# Patient Record
Sex: MALE | Race: WHITE | NOT HISPANIC OR LATINO | Employment: FULL TIME | URBAN - METROPOLITAN AREA
[De-identification: names, ages, dates, MRNs, and addresses within clinical notes are randomized per-mention and may not be internally consistent; named-entity substitution may affect disease eponyms.]

---

## 2019-08-29 ENCOUNTER — TELEPHONE (OUTPATIENT)
Dept: INTERVENTIONAL RADIOLOGY/VASCULAR | Facility: CLINIC | Age: 59
End: 2019-08-29

## 2020-07-15 ENCOUNTER — HOSPITAL ENCOUNTER (INPATIENT)
Facility: HOSPITAL | Age: 60
LOS: 2 days | Discharge: HOME | DRG: 177 | End: 2020-07-17
Attending: EMERGENCY MEDICINE | Admitting: HOSPITALIST
Payer: COMMERCIAL

## 2020-07-15 DIAGNOSIS — R51.9 ACUTE NONINTRACTABLE HEADACHE, UNSPECIFIED HEADACHE TYPE: ICD-10-CM

## 2020-07-15 DIAGNOSIS — R05.9 COUGH: ICD-10-CM

## 2020-07-15 DIAGNOSIS — R06.02 SHORTNESS OF BREATH: ICD-10-CM

## 2020-07-15 DIAGNOSIS — R11.0 NAUSEA: ICD-10-CM

## 2020-07-15 DIAGNOSIS — U07.1 COVID-19: Primary | ICD-10-CM

## 2020-07-15 DIAGNOSIS — R09.02 HYPOXIA: ICD-10-CM

## 2020-07-15 PROBLEM — J96.01 ACUTE RESPIRATORY FAILURE WITH HYPOXIA: Status: ACTIVE | Noted: 2020-07-15

## 2020-07-15 PROBLEM — J12.82 PNEUMONIA DUE TO COVID-19 VIRUS: Status: ACTIVE | Noted: 2020-07-15

## 2020-07-15 LAB
ALBUMIN SERPL BCP-MCNC: 2.3 G/DL (ref 3.5–5.2)
ALP SERPL-CCNC: 153 U/L (ref 55–135)
ALT SERPL W/O P-5'-P-CCNC: 91 U/L (ref 10–44)
ANION GAP SERPL CALC-SCNC: 11 MMOL/L (ref 8–16)
AST SERPL-CCNC: 80 U/L (ref 10–40)
BASOPHILS # BLD AUTO: 0.02 K/UL (ref 0–0.2)
BASOPHILS NFR BLD: 0.2 % (ref 0–1.9)
BILIRUB SERPL-MCNC: 0.6 MG/DL (ref 0.1–1)
BNP SERPL-MCNC: 25 PG/ML (ref 0–99)
BUN SERPL-MCNC: 7 MG/DL (ref 8–23)
CALCIUM SERPL-MCNC: 8.7 MG/DL (ref 8.7–10.5)
CHLORIDE SERPL-SCNC: 94 MMOL/L (ref 95–110)
CK SERPL-CCNC: 33 U/L (ref 20–200)
CO2 SERPL-SCNC: 27 MMOL/L (ref 23–29)
CREAT SERPL-MCNC: 0.8 MG/DL (ref 0.5–1.4)
CRP SERPL-MCNC: 311.9 MG/L (ref 0–8.2)
DIFFERENTIAL METHOD: ABNORMAL
EOSINOPHIL # BLD AUTO: 0.1 K/UL (ref 0–0.5)
EOSINOPHIL NFR BLD: 1.1 % (ref 0–8)
ERYTHROCYTE [DISTWIDTH] IN BLOOD BY AUTOMATED COUNT: 12.7 % (ref 11.5–14.5)
EST. GFR  (AFRICAN AMERICAN): >60 ML/MIN/1.73 M^2
EST. GFR  (NON AFRICAN AMERICAN): >60 ML/MIN/1.73 M^2
FERRITIN SERPL-MCNC: 2332 NG/ML (ref 20–300)
GLUCOSE SERPL-MCNC: 105 MG/DL (ref 70–110)
HCT VFR BLD AUTO: 39.4 % (ref 40–54)
HGB BLD-MCNC: 13.1 G/DL (ref 14–18)
IMM GRANULOCYTES # BLD AUTO: 0.12 K/UL (ref 0–0.04)
IMM GRANULOCYTES NFR BLD AUTO: 1.5 % (ref 0–0.5)
LACTATE SERPL-SCNC: 1.4 MMOL/L (ref 0.5–2.2)
LDH SERPL L TO P-CCNC: 461 U/L (ref 110–260)
LYMPHOCYTES # BLD AUTO: 0.8 K/UL (ref 1–4.8)
LYMPHOCYTES NFR BLD: 9.5 % (ref 18–48)
MCH RBC QN AUTO: 30.5 PG (ref 27–31)
MCHC RBC AUTO-ENTMCNC: 33.2 G/DL (ref 32–36)
MCV RBC AUTO: 92 FL (ref 82–98)
MONOCYTES # BLD AUTO: 0.7 K/UL (ref 0.3–1)
MONOCYTES NFR BLD: 8.5 % (ref 4–15)
NEUTROPHILS # BLD AUTO: 6.5 K/UL (ref 1.8–7.7)
NEUTROPHILS NFR BLD: 79.2 % (ref 38–73)
NRBC BLD-RTO: 0 /100 WBC
PLATELET # BLD AUTO: 576 K/UL (ref 150–350)
PMV BLD AUTO: 9.6 FL (ref 9.2–12.9)
POTASSIUM SERPL-SCNC: 4.1 MMOL/L (ref 3.5–5.1)
PROCALCITONIN SERPL IA-MCNC: 0.09 NG/ML
PROT SERPL-MCNC: 7.6 G/DL (ref 6–8.4)
RBC # BLD AUTO: 4.3 M/UL (ref 4.6–6.2)
SARS-COV-2 RDRP RESP QL NAA+PROBE: POSITIVE
SODIUM SERPL-SCNC: 132 MMOL/L (ref 136–145)
TROPONIN I SERPL DL<=0.01 NG/ML-MCNC: 0.02 NG/ML (ref 0–0.03)
WBC # BLD AUTO: 8.24 K/UL (ref 3.9–12.7)

## 2020-07-15 PROCEDURE — 87040 BLOOD CULTURE FOR BACTERIA: CPT | Mod: 59

## 2020-07-15 PROCEDURE — 96375 TX/PRO/DX INJ NEW DRUG ADDON: CPT

## 2020-07-15 PROCEDURE — 25000003 PHARM REV CODE 250: Performed by: HOSPITALIST

## 2020-07-15 PROCEDURE — 83605 ASSAY OF LACTIC ACID: CPT

## 2020-07-15 PROCEDURE — 25000003 PHARM REV CODE 250: Performed by: EMERGENCY MEDICINE

## 2020-07-15 PROCEDURE — 25000242 PHARM REV CODE 250 ALT 637 W/ HCPCS: Performed by: EMERGENCY MEDICINE

## 2020-07-15 PROCEDURE — 80053 COMPREHEN METABOLIC PANEL: CPT

## 2020-07-15 PROCEDURE — 63600175 PHARM REV CODE 636 W HCPCS: Performed by: EMERGENCY MEDICINE

## 2020-07-15 PROCEDURE — 93005 ELECTROCARDIOGRAM TRACING: CPT

## 2020-07-15 PROCEDURE — 85025 COMPLETE CBC W/AUTO DIFF WBC: CPT

## 2020-07-15 PROCEDURE — 99285 EMERGENCY DEPT VISIT HI MDM: CPT | Mod: 25

## 2020-07-15 PROCEDURE — 84484 ASSAY OF TROPONIN QUANT: CPT

## 2020-07-15 PROCEDURE — 96374 THER/PROPH/DIAG INJ IV PUSH: CPT

## 2020-07-15 PROCEDURE — 84145 PROCALCITONIN (PCT): CPT

## 2020-07-15 PROCEDURE — 82550 ASSAY OF CK (CPK): CPT

## 2020-07-15 PROCEDURE — 93010 EKG 12-LEAD: ICD-10-PCS | Mod: ,,, | Performed by: INTERNAL MEDICINE

## 2020-07-15 PROCEDURE — 83880 ASSAY OF NATRIURETIC PEPTIDE: CPT

## 2020-07-15 PROCEDURE — 86140 C-REACTIVE PROTEIN: CPT

## 2020-07-15 PROCEDURE — 83615 LACTATE (LD) (LDH) ENZYME: CPT

## 2020-07-15 PROCEDURE — U0002 COVID-19 LAB TEST NON-CDC: HCPCS

## 2020-07-15 PROCEDURE — 11000001 HC ACUTE MED/SURG PRIVATE ROOM

## 2020-07-15 PROCEDURE — 63600175 PHARM REV CODE 636 W HCPCS: Performed by: HOSPITALIST

## 2020-07-15 PROCEDURE — 82728 ASSAY OF FERRITIN: CPT

## 2020-07-15 PROCEDURE — 93010 ELECTROCARDIOGRAM REPORT: CPT | Mod: ,,, | Performed by: INTERNAL MEDICINE

## 2020-07-15 RX ORDER — AZITHROMYCIN 250 MG/1
250 TABLET, FILM COATED ORAL DAILY
Status: DISCONTINUED | OUTPATIENT
Start: 2020-07-16 | End: 2020-07-17

## 2020-07-15 RX ORDER — ACETAMINOPHEN 325 MG/1
650 TABLET ORAL EVERY 6 HOURS PRN
Status: DISCONTINUED | OUTPATIENT
Start: 2020-07-15 | End: 2020-07-17 | Stop reason: HOSPADM

## 2020-07-15 RX ORDER — PROMETHAZINE HYDROCHLORIDE AND CODEINE PHOSPHATE 6.25; 1 MG/5ML; MG/5ML
10 SOLUTION ORAL EVERY 4 HOURS PRN
Status: DISCONTINUED | OUTPATIENT
Start: 2020-07-15 | End: 2020-07-17 | Stop reason: HOSPADM

## 2020-07-15 RX ORDER — ONDANSETRON 2 MG/ML
4 INJECTION INTRAMUSCULAR; INTRAVENOUS EVERY 4 HOURS PRN
Status: DISCONTINUED | OUTPATIENT
Start: 2020-07-15 | End: 2020-07-17 | Stop reason: HOSPADM

## 2020-07-15 RX ORDER — ALBUTEROL SULFATE 90 UG/1
6 AEROSOL, METERED RESPIRATORY (INHALATION)
Status: COMPLETED | OUTPATIENT
Start: 2020-07-15 | End: 2020-07-15

## 2020-07-15 RX ORDER — MORPHINE SULFATE 10 MG/ML
4 INJECTION INTRAMUSCULAR; INTRAVENOUS; SUBCUTANEOUS EVERY 4 HOURS PRN
Status: DISCONTINUED | OUTPATIENT
Start: 2020-07-15 | End: 2020-07-17 | Stop reason: HOSPADM

## 2020-07-15 RX ORDER — ALBUTEROL SULFATE 90 UG/1
6 AEROSOL, METERED RESPIRATORY (INHALATION) EVERY 4 HOURS PRN
Status: DISCONTINUED | OUTPATIENT
Start: 2020-07-15 | End: 2020-07-17 | Stop reason: HOSPADM

## 2020-07-15 RX ORDER — ENOXAPARIN SODIUM 100 MG/ML
40 INJECTION SUBCUTANEOUS EVERY 24 HOURS
Status: DISCONTINUED | OUTPATIENT
Start: 2020-07-16 | End: 2020-07-17 | Stop reason: HOSPADM

## 2020-07-15 RX ORDER — PROMETHAZINE HYDROCHLORIDE AND CODEINE PHOSPHATE 6.25; 1 MG/5ML; MG/5ML
10 SOLUTION ORAL
Status: COMPLETED | OUTPATIENT
Start: 2020-07-15 | End: 2020-07-15

## 2020-07-15 RX ORDER — ONDANSETRON 2 MG/ML
4 INJECTION INTRAMUSCULAR; INTRAVENOUS
Status: COMPLETED | OUTPATIENT
Start: 2020-07-15 | End: 2020-07-15

## 2020-07-15 RX ORDER — ENOXAPARIN SODIUM 100 MG/ML
1 INJECTION SUBCUTANEOUS EVERY 12 HOURS
Status: DISCONTINUED | OUTPATIENT
Start: 2020-07-15 | End: 2020-07-15

## 2020-07-15 RX ORDER — POLYETHYLENE GLYCOL 3350 17 G/17G
17 POWDER, FOR SOLUTION ORAL DAILY
Status: DISCONTINUED | OUTPATIENT
Start: 2020-07-15 | End: 2020-07-17 | Stop reason: HOSPADM

## 2020-07-15 RX ORDER — AMOXICILLIN 250 MG
1 CAPSULE ORAL 2 TIMES DAILY
Status: DISCONTINUED | OUTPATIENT
Start: 2020-07-15 | End: 2020-07-17 | Stop reason: HOSPADM

## 2020-07-15 RX ORDER — SODIUM CHLORIDE 0.9 % (FLUSH) 0.9 %
10 SYRINGE (ML) INJECTION
Status: DISCONTINUED | OUTPATIENT
Start: 2020-07-15 | End: 2020-07-17 | Stop reason: HOSPADM

## 2020-07-15 RX ORDER — KETOROLAC TROMETHAMINE 30 MG/ML
30 INJECTION, SOLUTION INTRAMUSCULAR; INTRAVENOUS EVERY 6 HOURS PRN
Status: DISCONTINUED | OUTPATIENT
Start: 2020-07-15 | End: 2020-07-17 | Stop reason: HOSPADM

## 2020-07-15 RX ORDER — FAMOTIDINE 20 MG/1
20 TABLET, FILM COATED ORAL DAILY
Status: DISCONTINUED | OUTPATIENT
Start: 2020-07-15 | End: 2020-07-17 | Stop reason: HOSPADM

## 2020-07-15 RX ORDER — KETOROLAC TROMETHAMINE 30 MG/ML
30 INJECTION, SOLUTION INTRAMUSCULAR; INTRAVENOUS
Status: COMPLETED | OUTPATIENT
Start: 2020-07-15 | End: 2020-07-15

## 2020-07-15 RX ADMIN — DOCUSATE SODIUM 50 MG AND SENNOSIDES 8.6 MG 1 TABLET: 8.6; 5 TABLET, FILM COATED ORAL at 09:07

## 2020-07-15 RX ADMIN — ONDANSETRON HYDROCHLORIDE 4 MG: 2 SOLUTION INTRAMUSCULAR; INTRAVENOUS at 05:07

## 2020-07-15 RX ADMIN — ENOXAPARIN SODIUM 80 MG: 80 INJECTION SUBCUTANEOUS at 11:07

## 2020-07-15 RX ADMIN — KETOROLAC TROMETHAMINE 30 MG: 30 INJECTION, SOLUTION INTRAMUSCULAR at 05:07

## 2020-07-15 RX ADMIN — DEXAMETHASONE 6 MG: 2 TABLET ORAL at 05:07

## 2020-07-15 RX ADMIN — FAMOTIDINE 20 MG: 20 TABLET ORAL at 11:07

## 2020-07-15 RX ADMIN — PROMETHAZINE HYDROCHLORIDE AND CODEINE PHOSPHATE 10 ML: 10; 6.25 SOLUTION ORAL at 05:07

## 2020-07-15 RX ADMIN — DOCUSATE SODIUM 50 MG AND SENNOSIDES 8.6 MG 1 TABLET: 8.6; 5 TABLET, FILM COATED ORAL at 11:07

## 2020-07-15 RX ADMIN — CEFTRIAXONE 1 G: 1 INJECTION, SOLUTION INTRAVENOUS at 07:07

## 2020-07-15 RX ADMIN — AZITHROMYCIN 500 MG: 500 INJECTION, POWDER, LYOPHILIZED, FOR SOLUTION INTRAVENOUS at 08:07

## 2020-07-15 RX ADMIN — ALBUTEROL SULFATE 6 PUFF: 90 AEROSOL, METERED RESPIRATORY (INHALATION) at 05:07

## 2020-07-15 RX ADMIN — POLYETHYLENE GLYCOL 3350 17 G: 17 POWDER, FOR SOLUTION ORAL at 11:07

## 2020-07-15 NOTE — SUBJECTIVE & OBJECTIVE
No past medical history on file.    No past surgical history on file.    Review of patient's allergies indicates:  No Known Allergies    No current facility-administered medications on file prior to encounter.      Current Outpatient Medications on File Prior to Encounter   Medication Sig    mv-min-FA-Rb-Lq-kawxnte-lutein (CENTRUM) 0.4-162-18 mg Tab Take 1 tablet by mouth Daily. 1 Tablet Oral Every day    niacin 250 MG CpSR Take 1 capsule by mouth Daily. 1 Capsule, Sustained Release Oral Every day     Family History     None        Tobacco Use    Smoking status: Not on file   Substance and Sexual Activity    Alcohol use: Not on file    Drug use: Not on file    Sexual activity: Not on file     Review of Systems   Constitutional: Positive for activity change and appetite change.   HENT: Negative for congestion and dental problem.    Eyes: Negative for discharge and itching.   Respiratory: Positive for cough and shortness of breath. Negative for apnea and chest tightness.    Cardiovascular: Negative for chest pain and leg swelling.   Gastrointestinal: Negative for abdominal distention and abdominal pain.   Endocrine: Negative for cold intolerance and polydipsia.   Genitourinary: Negative for difficulty urinating and dysuria.   Musculoskeletal: Negative for arthralgias and back pain.   Skin: Negative for color change and pallor.   Allergic/Immunologic: Negative for environmental allergies and food allergies.   Neurological: Negative for dizziness and facial asymmetry.   Hematological: Negative for adenopathy. Does not bruise/bleed easily.   Psychiatric/Behavioral: Negative for agitation.     Objective:     Vital Signs (Most Recent):  Temp: 97.9 °F (36.6 °C) (07/15/20 0829)  Pulse: 90 (07/15/20 0817)  Resp: (!) 33 (07/15/20 0817)  BP: 112/68 (07/15/20 0817)  SpO2: 95 % (07/15/20 0817) Vital Signs (24h Range):  Temp:  [97.9 °F (36.6 °C)-100.1 °F (37.8 °C)] 97.9 °F (36.6 °C)  Pulse:  [] 90  Resp:  [20-51]  33  SpO2:  [91 %-95 %] 95 %  BP: (106-160)/(61-90) 112/68     Weight: 83.9 kg (185 lb)  Body mass index is 24.41 kg/m².    Physical Exam  Constitutional:       Appearance: Normal appearance.   HENT:      Head: Normocephalic and atraumatic.      Nose: Nose normal.   Eyes:      Extraocular Movements: Extraocular movements intact.      Pupils: Pupils are equal, round, and reactive to light.   Neck:      Musculoskeletal: Normal range of motion and neck supple.   Cardiovascular:      Rate and Rhythm: Normal rate and regular rhythm.   Pulmonary:      Effort: Pulmonary effort is normal.      Breath sounds: Normal breath sounds.   Abdominal:      General: Abdomen is flat. There is no distension.      Palpations: Abdomen is soft.   Musculoskeletal: Normal range of motion.         General: No swelling or tenderness.   Skin:     General: Skin is warm and dry.   Neurological:      General: No focal deficit present.      Mental Status: He is alert and oriented to person, place, and time.   Psychiatric:         Mood and Affect: Mood normal.         Thought Content: Thought content normal.           CRANIAL NERVES     CN III, IV, VI   Pupils are equal, round, and reactive to light.       Significant Labs:   CBC:   Recent Labs   Lab 07/15/20  0413   WBC 8.24   HGB 13.1*   HCT 39.4*   *     CMP:   Recent Labs   Lab 07/15/20  0413   *   K 4.1   CL 94*   CO2 27      BUN 7*   CREATININE 0.8   CALCIUM 8.7   PROT 7.6   ALBUMIN 2.3*   BILITOT 0.6   ALKPHOS 153*   AST 80*   ALT 91*   ANIONGAP 11   EGFRNONAA >60       Significant Imaging: reviewed.

## 2020-07-15 NOTE — ASSESSMENT & PLAN NOTE
patient has positive Covid in ER,chest X ray show,Pulmonary infiltrates, the findings are more prominent on the right with areas of suspected developing confluence,on IV Abx,

## 2020-07-15 NOTE — H&P
[facility] Hospital Medicine  History & Physical    Patient Name: Omid Cadena  MRN: [id number]  Admission Date: 7/15/2020  Attending Physician: Diann Garcia MD   Primary Care Provider: Paolo Sellers NP         Patient information was obtained from patient and ER records.     Subjective:     Principal Problem:Pneumonia due to COVID-19 virus    Chief Complaint:   Chief Complaint   Patient presents with    Shortness of Breath     reports testing COVID+ 13 days ago and has been feeling short of breath since then, worsened tonight; 88% on RA with EMS, improvement to 95% on 3 L O2 NC; also c/o cough and fever        HPI: 60 yo male recently COVID+ (7/2/20) presents via EMS with acute moderate shortness of breath with hypoxia (84% on home pulse ox), cough productive of white sputum, moderate frontal headache with photophobia, nausea without vomiting, diarrhea, loss of appetite, and difficulty sleeping. Patient states he felt well when he initially tested positive for COVID-19 about two weeks ago, but his current symptoms have developed and worsened about 6 days ago.patient has positive Covid in ER,chest X ray show,Pulmonary infiltrates, the findings are more prominent on the right with areas of suspected developing confluence,his hypoxia is improved with 3 liter NC O 2.    No past medical history on file.    No past surgical history on file.    Review of patient's allergies indicates:  No Known Allergies    No current facility-administered medications on file prior to encounter.      Current Outpatient Medications on File Prior to Encounter   Medication Sig    mv-min-FA-If-Id-lalaxyo-lutein (CENTRUM) 0.4-162-18 mg Tab Take 1 tablet by mouth Daily. 1 Tablet Oral Every day    niacin 250 MG CpSR Take 1 capsule by mouth Daily. 1 Capsule, Sustained Release Oral Every day     Family History     None        Tobacco Use    Smoking status: Not on file   Substance and Sexual Activity    Alcohol  use: Not on file    Drug use: Not on file    Sexual activity: Not on file     Review of Systems   Constitutional: Positive for activity change and appetite change.   HENT: Negative for congestion and dental problem.    Eyes: Negative for discharge and itching.   Respiratory: Positive for cough and shortness of breath. Negative for apnea and chest tightness.    Cardiovascular: Negative for chest pain and leg swelling.   Gastrointestinal: Negative for abdominal distention and abdominal pain.   Endocrine: Negative for cold intolerance and polydipsia.   Genitourinary: Negative for difficulty urinating and dysuria.   Musculoskeletal: Negative for arthralgias and back pain.   Skin: Negative for color change and pallor.   Allergic/Immunologic: Negative for environmental allergies and food allergies.   Neurological: Negative for dizziness and facial asymmetry.   Hematological: Negative for adenopathy. Does not bruise/bleed easily.   Psychiatric/Behavioral: Negative for agitation.     Objective:     Vital Signs (Most Recent):  Temp: 97.9 °F (36.6 °C) (07/15/20 0829)  Pulse: 90 (07/15/20 0817)  Resp: (!) 33 (07/15/20 0817)  BP: 112/68 (07/15/20 0817)  SpO2: 95 % (07/15/20 0817) Vital Signs (24h Range):  Temp:  [97.9 °F (36.6 °C)-100.1 °F (37.8 °C)] 97.9 °F (36.6 °C)  Pulse:  [] 90  Resp:  [20-51] 33  SpO2:  [91 %-95 %] 95 %  BP: (106-160)/(61-90) 112/68     Weight: 83.9 kg (185 lb)  Body mass index is 24.41 kg/m².    Physical Exam  Constitutional:       Appearance: Normal appearance.   HENT:      Head: Normocephalic and atraumatic.      Nose: Nose normal.   Eyes:      Extraocular Movements: Extraocular movements intact.      Pupils: Pupils are equal, round, and reactive to light.   Neck:      Musculoskeletal: Normal range of motion and neck supple.   Cardiovascular:      Rate and Rhythm: Normal rate and regular rhythm.   Pulmonary:      Effort: Pulmonary effort is normal.      Breath sounds: Normal breath sounds.    Abdominal:      General: Abdomen is flat. There is no distension.      Palpations: Abdomen is soft.   Musculoskeletal: Normal range of motion.         General: No swelling or tenderness.   Skin:     General: Skin is warm and dry.   Neurological:      General: No focal deficit present.      Mental Status: He is alert and oriented to person, place, and time.   Psychiatric:         Mood and Affect: Mood normal.         Thought Content: Thought content normal.           CRANIAL NERVES     CN III, IV, VI   Pupils are equal, round, and reactive to light.       Significant Labs:   CBC:   Recent Labs   Lab 07/15/20  0413   WBC 8.24   HGB 13.1*   HCT 39.4*   *     CMP:   Recent Labs   Lab 07/15/20  0413   *   K 4.1   CL 94*   CO2 27      BUN 7*   CREATININE 0.8   CALCIUM 8.7   PROT 7.6   ALBUMIN 2.3*   BILITOT 0.6   ALKPHOS 153*   AST 80*   ALT 91*   ANIONGAP 11   EGFRNONAA >60       Significant Imaging: reviewed.    Assessment/Plan:     * Pneumonia due to COVID-19 virus    patient has positive Covid in ER,chest X ray show,Pulmonary infiltrates, the findings are more prominent on the right with areas of suspected developing confluence,on IV Abx,    Acute respiratory failure with hypoxia  Duo to Covid ,84% on RA,improved with supplement  Oxygen,stable on Nc O 2,      COVID-19 virus infection  Patient has no improvement after first positive Covid test,he is still positive with covid,symptomatic, and hypoxic,on isolation per covid protocol,on IV Abx,decadron,he qualify for starting remdisivir,he has slight elevated LFT,likley duo to Covid,consent is obtained,he is agree with starting remdesivir,consulted pharmacy,will monitor with daily CMP.      VTE Risk Mitigation (From admission, onward)         Ordered     enoxaparin injection 40 mg  Every 24 hours      07/15/20 1136     IP VTE HIGH RISK PATIENT  Once      07/15/20 1038     Place sequential compression device  Until discontinued      07/15/20 1038      Place JONATHAN hose  Until discontinued      07/15/20 1038                   Diann Garcia MD  Department of Hospital Medicine   [facility]

## 2020-07-15 NOTE — ASSESSMENT & PLAN NOTE
Patient has no improvement after first positive Covid test,he is still positive with covid,symptomatic, and hypoxic,on isolation per covid protocol,on IV Abx,decadron,he qualify for starting remdisivir,he has slight elevated LFT,na duo to Covid,consent is obtained,he is agree with starting remdesivir,consulted pharmacy,will monitor with daily CMP.

## 2020-07-15 NOTE — HPI
58 yo male recently COVID+ (7/2/20) presents via EMS with acute moderate shortness of breath with hypoxia (84% on home pulse ox), cough productive of white sputum, moderate frontal headache with photophobia, nausea without vomiting, diarrhea, loss of appetite, and difficulty sleeping. Patient states he felt well when he initially tested positive for COVID-19 about two weeks ago, but his current symptoms have developed and worsened about 6 days ago.patient has positive Covid in ER,chest X ray show,Pulmonary infiltrates, the findings are more prominent on the right with areas of suspected developing confluence,his hypoxia is improved with 3 liter NC O 2.

## 2020-07-15 NOTE — ED PROVIDER NOTES
Encounter Date: 7/15/2020       History     Chief Complaint   Patient presents with    Shortness of Breath     reports testing COVID+ 13 days ago and has been feeling short of breath since then, worsened tonight; 88% on RA with EMS, improvement to 95% on 3 L O2 NC; also c/o cough and fever     58 yo male recently COVID+ (7/2/20) presents via EMS with acute moderate shortness of breath with hypoxia (84% on home pulse ox), cough productive of white sputum, moderate frontal headache with photophobia, nausea without vomiting, diarrhea, loss of appetite, and difficulty sleeping. Patient states he felt well when he initially tested positive for COVID-19 about two weeks ago, but his current symptoms have developed and worsened about 6 days ago. Patient started taking his own colchicine (rx'ed for gout) about TID about 2 days ago, and has also been taking Zinc, both without relief.     EMS found patient with sats 88% on room air which improved to 95% on O2 via NC at 3L.     PMH: HTN, DLD, L sided back pain, R hip osteoarthritis, gout, hypokalemia, EtOH    PMD at [facility]. See CareEverywhere.         Review of patient's allergies indicates:  No Known Allergies  No past medical history on file.  No past surgical history on file.  No family history on file.  Social History     Tobacco Use    Smoking status: Not on file   Substance Use Topics    Alcohol use: Not on file    Drug use: Not on file     Review of Systems   Constitutional: Positive for appetite change and fatigue.   HENT: Negative for rhinorrhea and sore throat.    Eyes: Positive for photophobia (with headache).   Respiratory: Positive for cough and shortness of breath.    Cardiovascular: Negative for leg swelling.   Gastrointestinal: Positive for diarrhea and nausea.   Genitourinary: Negative for dysuria.   Musculoskeletal: Negative for neck pain and neck stiffness.   Skin: Negative for rash.   Neurological: Negative for syncope.       Physical  Exam     Initial Vitals [07/15/20 0356]   BP Pulse Resp Temp SpO2   (!) 160/90 103 20 100.1 °F (37.8 °C) 95 %      MAP       --         Physical Exam    Nursing note and vitals reviewed.  Constitutional: He appears well-developed and well-nourished. He is not diaphoretic.   Awake, alert, mildly uncomfortable male. Patient is able to speak in relatively normal-length sentences on O2 by NC.    HENT:   Head: Normocephalic and atraumatic.   Eyes: Conjunctivae and EOM are normal. Pupils are equal, round, and reactive to light.   Neck: Normal range of motion. Neck supple.   Cardiovascular: Regular rhythm, normal heart sounds and intact distal pulses.   No murmur heard.  Borderline tachycardic   Pulmonary/Chest: He is in respiratory distress. He has wheezes (expiratory). He has rhonchi (LLL). He has no rales.   Abdominal: Soft. There is no abdominal tenderness.   Musculoskeletal: Normal range of motion. No tenderness or edema.   Neurological: He is alert and oriented to person, place, and time. He has normal strength.   Moving all extremities   Skin: Skin is warm and dry.   Psychiatric: He has a normal mood and affect.         ED Course   Procedures  Labs Reviewed   CBC W/ AUTO DIFFERENTIAL - Abnormal; Notable for the following components:       Result Value    RBC 4.30 (*)     Hemoglobin 13.1 (*)     Hematocrit 39.4 (*)     Platelets 576 (*)     Immature Granulocytes 1.5 (*)     Immature Grans (Abs) 0.12 (*)     Lymph # 0.8 (*)     Gran% 79.2 (*)     Lymph% 9.5 (*)     All other components within normal limits   COMPREHENSIVE METABOLIC PANEL - Abnormal; Notable for the following components:    Sodium 132 (*)     Chloride 94 (*)     BUN, Bld 7 (*)     Albumin 2.3 (*)     Alkaline Phosphatase 153 (*)     AST 80 (*)     ALT 91 (*)     All other components within normal limits   C-REACTIVE PROTEIN - Abnormal; Notable for the following components:    .9 (*)     All other components within normal limits   LACTATE  DEHYDROGENASE - Abnormal; Notable for the following components:     (*)     All other components within normal limits    Narrative:     Recoll. [id number] by [facility] at 07/15/2020 06:31, reason: Specimen   hemolyzed 07/15/2020  06:31   SARS-COV-2 RNA AMPLIFICATION, QUAL - Abnormal; Notable for the following components:    SARS-CoV-2 RNA, Amplification, Qual Positive (*)     All other components within normal limits   CULTURE, BLOOD   CULTURE, BLOOD   CK   LACTIC ACID, PLASMA   TROPONIN I   PROCALCITONIN   B-TYPE NATRIURETIC PEPTIDE   FERRITIN     EKG Readings: (Independently Interpreted)   04:28: Sinus tach, . Normal axis. No ectopy. No STEMI.      ECG Results          EKG 12-lead (In process)  Result time 07/15/20 06:32:10    In process by Interface, Lab In [facility] (07/15/20 06:32:10)                 Narrative:    Test Reason : R68.89,    Vent. Rate : 100 BPM     Atrial Rate : 100 BPM     P-R Int : 126 ms          QRS Dur : 088 ms      QT Int : 368 ms       P-R-T Axes : 047 078 046 degrees     QTc Int : 474 ms    Normal sinus rhythm  Normal ECG  No previous ECGs available    Referred By: [email or web address]   SELF           Confirmed By:                   In process by Interface, Lab In [facility] (07/15/20 06:22:53)                 Narrative:    Test Reason : R68.89,    Vent. Rate : 100 BPM     Atrial Rate : 100 BPM     P-R Int : 126 ms          QRS Dur : 088 ms      QT Int : 368 ms       P-R-T Axes : 047 078 046 degrees     QTc Int : 474 ms    Normal sinus rhythm  Normal ECG  No previous ECGs available    Referred By: [email or web address]   SELF           Confirmed By:                             Imaging Results          X-Ray Chest AP Portable (Final result)  Result time 07/15/20 04:55:35    Final result by Xavi Mcintyre MD (07/15/20 04:55:35)                 Impression:      Pulmonary infiltrates, the findings are more prominent on the right with areas of suspected developing confluence as discussed  above.      Electronically signed by: Xavi Castellanoszier  Date:    07/15/2020  Time:    04:55             Narrative:    EXAMINATION:  XR CHEST AP PORTABLE    CLINICAL HISTORY:  Suspected Covid-19 Virus Infection;    TECHNIQUE:  Single frontal view of the chest was performed.    COMPARISON:  June 29, 2007    FINDINGS:  Single chest view is submitted, there is diminished depth of inspiration and mild elevation of the right hemidiaphragm.  The cardiomediastinal silhouette appears appropriate.  Bilateral pattern of pulmonary opacity including appearance of suspected mild interstitial as well as alveolar infiltrate noted, findings are more prominent on the right with perihilar central confluent alveolar infiltrate on the right as well as confluent alveolar infiltrate at the right upper chest.  There is no evidence for significant pleural effusion or pneumothorax.  The osseous structures demonstrate chronic change.                              X-Rays:   Independently Interpreted Readings:   Other Readings:  CXR +bilateral infiltrates ground-glass    Medical Decision Making:   History:   Old Medical Records: I decided to obtain old medical records.  Old Records Summarized: records from another hospital.  Initial Assessment:   59 y.o. male with shortness of breath, cough, headache, nausea, diarrhea, fatigue, loss of appetite, poor sleep. Recently diagnosed with COVID-19. Hypoxic on room air PTA.  Differential Diagnosis:   Ddx includes worsening COVID-19, ACS, CHF, PE, secondary bacterial PNA, other.  Independently Interpreted Test(s):   I have ordered and independently interpreted X-rays - see prior notes.  I have ordered and independently interpreted EKG Reading(s) - see prior notes  Clinical Tests:   Lab Tests: Ordered and Reviewed  Radiological Study: Ordered and Reviewed  Medical Tests: Ordered and Reviewed  ED Management:  EKG no STEMI.     CXR +bilat infiltrates ground-glass in appearance    Labs: COVID-19 positive. LDH  461. . CPK, lactate, BNP, procalcitonin normal.     Patient sats remained normal only on nasal cannula. He had Toradol for headache and phenergan/codeine for cough. He had albuterol MDI for shortness of breath. He had zofran for nausea. I ordered decadron for COVID-19.    Patient requires admit for hypoxia requiring supplemental O2. I have discussed his case with Dr. Gurmeet Harris and I have courtesy orders.   Other:   I have discussed this case with another health care provider.                                 Clinical Impression:       ICD-10-CM ICD-9-CM   1. COVID-19  U07.1    2. Hypoxia  R09.02 799.02   3. Nausea  R11.0 787.02   4. Cough  R05 786.2   5. Acute nonintractable headache, unspecified headache type  R51 784.0   6. Shortness of breath  R06.02 786.05             ED Disposition Condition    Admit                           Sarah Monique MD  07/15/20 3057

## 2020-07-16 LAB
ALBUMIN SERPL BCP-MCNC: 2.3 G/DL (ref 3.5–5.2)
ALP SERPL-CCNC: 159 U/L (ref 55–135)
ALT SERPL W/O P-5'-P-CCNC: 94 U/L (ref 10–44)
ANION GAP SERPL CALC-SCNC: 9 MMOL/L (ref 8–16)
AST SERPL-CCNC: 55 U/L (ref 10–40)
BASOPHILS # BLD AUTO: 0.03 K/UL (ref 0–0.2)
BASOPHILS NFR BLD: 0.3 % (ref 0–1.9)
BILIRUB SERPL-MCNC: 0.3 MG/DL (ref 0.1–1)
BUN SERPL-MCNC: 19 MG/DL (ref 8–23)
CALCIUM SERPL-MCNC: 9.5 MG/DL (ref 8.7–10.5)
CHLORIDE SERPL-SCNC: 100 MMOL/L (ref 95–110)
CO2 SERPL-SCNC: 29 MMOL/L (ref 23–29)
CREAT SERPL-MCNC: 0.8 MG/DL (ref 0.5–1.4)
DIFFERENTIAL METHOD: ABNORMAL
EOSINOPHIL # BLD AUTO: 0 K/UL (ref 0–0.5)
EOSINOPHIL NFR BLD: 0.1 % (ref 0–8)
ERYTHROCYTE [DISTWIDTH] IN BLOOD BY AUTOMATED COUNT: 12.8 % (ref 11.5–14.5)
EST. GFR  (AFRICAN AMERICAN): >60 ML/MIN/1.73 M^2
EST. GFR  (NON AFRICAN AMERICAN): >60 ML/MIN/1.73 M^2
GLUCOSE SERPL-MCNC: 113 MG/DL (ref 70–110)
HCT VFR BLD AUTO: 41.3 % (ref 40–54)
HGB BLD-MCNC: 13.7 G/DL (ref 14–18)
IMM GRANULOCYTES # BLD AUTO: 0.16 K/UL (ref 0–0.04)
IMM GRANULOCYTES NFR BLD AUTO: 1.8 % (ref 0–0.5)
LYMPHOCYTES # BLD AUTO: 0.9 K/UL (ref 1–4.8)
LYMPHOCYTES NFR BLD: 10 % (ref 18–48)
MCH RBC QN AUTO: 30.4 PG (ref 27–31)
MCHC RBC AUTO-ENTMCNC: 33.2 G/DL (ref 32–36)
MCV RBC AUTO: 92 FL (ref 82–98)
MONOCYTES # BLD AUTO: 0.8 K/UL (ref 0.3–1)
MONOCYTES NFR BLD: 9.2 % (ref 4–15)
NEUTROPHILS # BLD AUTO: 7.1 K/UL (ref 1.8–7.7)
NEUTROPHILS NFR BLD: 78.6 % (ref 38–73)
NRBC BLD-RTO: 0 /100 WBC
PLATELET # BLD AUTO: 696 K/UL (ref 150–350)
PMV BLD AUTO: 9.8 FL (ref 9.2–12.9)
POTASSIUM SERPL-SCNC: 4.5 MMOL/L (ref 3.5–5.1)
PROT SERPL-MCNC: 7.5 G/DL (ref 6–8.4)
RBC # BLD AUTO: 4.5 M/UL (ref 4.6–6.2)
SODIUM SERPL-SCNC: 138 MMOL/L (ref 136–145)
TROPONIN I SERPL DL<=0.01 NG/ML-MCNC: <0.006 NG/ML (ref 0–0.03)
WBC # BLD AUTO: 9.03 K/UL (ref 3.9–12.7)

## 2020-07-16 PROCEDURE — 25000242 PHARM REV CODE 250 ALT 637 W/ HCPCS: Performed by: EMERGENCY MEDICINE

## 2020-07-16 PROCEDURE — 84484 ASSAY OF TROPONIN QUANT: CPT

## 2020-07-16 PROCEDURE — 63600175 PHARM REV CODE 636 W HCPCS: Performed by: HOSPITALIST

## 2020-07-16 PROCEDURE — 94640 AIRWAY INHALATION TREATMENT: CPT

## 2020-07-16 PROCEDURE — 25000242 PHARM REV CODE 250 ALT 637 W/ HCPCS: Performed by: HOSPITALIST

## 2020-07-16 PROCEDURE — 25000003 PHARM REV CODE 250: Performed by: EMERGENCY MEDICINE

## 2020-07-16 PROCEDURE — 11000001 HC ACUTE MED/SURG PRIVATE ROOM

## 2020-07-16 PROCEDURE — 80053 COMPREHEN METABOLIC PANEL: CPT

## 2020-07-16 PROCEDURE — 36415 COLL VENOUS BLD VENIPUNCTURE: CPT

## 2020-07-16 PROCEDURE — 94761 N-INVAS EAR/PLS OXIMETRY MLT: CPT

## 2020-07-16 PROCEDURE — 27000221 HC OXYGEN, UP TO 24 HOURS

## 2020-07-16 PROCEDURE — 85025 COMPLETE CBC W/AUTO DIFF WBC: CPT

## 2020-07-16 PROCEDURE — 63700000 PHARM REV CODE 250 ALT 637 W/O HCPCS: Performed by: HOSPITALIST

## 2020-07-16 PROCEDURE — 63600175 PHARM REV CODE 636 W HCPCS: Performed by: EMERGENCY MEDICINE

## 2020-07-16 RX ORDER — BUDESONIDE 0.5 MG/2ML
0.5 INHALANT ORAL EVERY 12 HOURS
Status: DISCONTINUED | OUTPATIENT
Start: 2020-07-16 | End: 2020-07-17

## 2020-07-16 RX ADMIN — DOCUSATE SODIUM 50 MG AND SENNOSIDES 8.6 MG 1 TABLET: 8.6; 5 TABLET, FILM COATED ORAL at 09:07

## 2020-07-16 RX ADMIN — ENOXAPARIN SODIUM 40 MG: 40 INJECTION SUBCUTANEOUS at 04:07

## 2020-07-16 RX ADMIN — AZITHROMYCIN MONOHYDRATE 250 MG: 250 TABLET ORAL at 08:07

## 2020-07-16 RX ADMIN — BUDESONIDE 0.5 MG: 0.5 INHALANT RESPIRATORY (INHALATION) at 03:07

## 2020-07-16 RX ADMIN — DOCUSATE SODIUM 50 MG AND SENNOSIDES 8.6 MG 1 TABLET: 8.6; 5 TABLET, FILM COATED ORAL at 08:07

## 2020-07-16 RX ADMIN — BUDESONIDE 0.5 MG: 0.5 INHALANT RESPIRATORY (INHALATION) at 08:07

## 2020-07-16 RX ADMIN — CEFTRIAXONE 1 G: 1 INJECTION, SOLUTION INTRAVENOUS at 08:07

## 2020-07-16 RX ADMIN — FAMOTIDINE 20 MG: 20 TABLET ORAL at 08:07

## 2020-07-16 RX ADMIN — DEXAMETHASONE 6 MG: 4 TABLET ORAL at 08:07

## 2020-07-16 NOTE — HOSPITAL COURSE
58 yo male recently COVID+ (7/2/20) presents via EMS with acute moderate shortness of breath with hypoxia (84% on home pulse ox), cough productive of white sputum, moderate frontal headache with photophobia, nausea without vomiting, diarrhea, loss of appetite, and difficulty sleeping. Patient states he felt well when he initially tested positive for COVID-19 about two weeks ago, but his current symptoms have developed and worsened about 6 days ago.patient has positive Covid in ER,chest X ray show,Pulmonary infiltrates, the findings are more prominent on the right with areas of suspected developing confluence,his hypoxia is improved with 3 liter NC O 2.  He does not qualify for remdisivir,since initial  Positive covid is over 10 days ago.he was feeling better,he was on IV Abx,and decadron,his hypoxia is improved,clinicaly was much better,but he still require 2 liter home oxygen at DC time.he will follow up with PCP in next few days.

## 2020-07-16 NOTE — SUBJECTIVE & OBJECTIVE
History reviewed. No pertinent past medical history.    History reviewed. No pertinent surgical history.    Review of patient's allergies indicates:  No Known Allergies    No current facility-administered medications on file prior to encounter.      Current Outpatient Medications on File Prior to Encounter   Medication Sig    mv-min-FA-Wo-Er-xlpehlz-lutein (CENTRUM) 0.4-162-18 mg Tab Take 1 tablet by mouth Daily. 1 Tablet Oral Every day    niacin 250 MG CpSR Take 1 capsule by mouth Daily. 1 Capsule, Sustained Release Oral Every day     Family History     None        Tobacco Use    Smoking status: Never Smoker    Smokeless tobacco: Never Used   Substance and Sexual Activity    Alcohol use: Not on file    Drug use: Not on file    Sexual activity: Not on file     Review of Systems   Constitutional: Positive for activity change and appetite change.   HENT: Negative for congestion and dental problem.    Eyes: Negative for discharge and itching.   Respiratory: Positive for cough and shortness of breath. Negative for apnea and chest tightness.    Cardiovascular: Negative for chest pain and leg swelling.   Gastrointestinal: Negative for abdominal distention and abdominal pain.   Endocrine: Negative for cold intolerance and polydipsia.   Genitourinary: Negative for difficulty urinating and dysuria.   Musculoskeletal: Negative for arthralgias and back pain.   Skin: Negative for color change and pallor.   Allergic/Immunologic: Negative for environmental allergies and food allergies.   Neurological: Negative for dizziness and facial asymmetry.   Hematological: Negative for adenopathy. Does not bruise/bleed easily.   Psychiatric/Behavioral: Negative for agitation.     Objective:     Vital Signs (Most Recent):  Temp: 97.8 °F (36.6 °C) (07/16/20 1124)  Pulse: 74 (07/16/20 1124)  Resp: 19 (07/16/20 1124)  BP: 111/71 (07/16/20 1124)  SpO2: (!) 93 % (07/16/20 1124) Vital Signs (24h Range):  Temp:  [97.5 °F (36.4 °C)-98 °F  (36.7 °C)] 97.8 °F (36.6 °C)  Pulse:  [68-74] 74  Resp:  [18-20] 19  SpO2:  [92 %-98 %] 93 %  BP: (108-116)/(61-72) 111/71     Weight: 85.4 kg (188 lb 4.8 oz)  Body mass index is 24.84 kg/m².    Physical Exam  Constitutional:       Appearance: Normal appearance.   HENT:      Head: Normocephalic and atraumatic.      Nose: Nose normal.   Eyes:      Extraocular Movements: Extraocular movements intact.      Pupils: Pupils are equal, round, and reactive to light.   Neck:      Musculoskeletal: Normal range of motion and neck supple.   Cardiovascular:      Rate and Rhythm: Normal rate and regular rhythm.   Pulmonary:      Effort: Pulmonary effort is normal.      Breath sounds: Normal breath sounds.   Abdominal:      General: Abdomen is flat. There is no distension.      Palpations: Abdomen is soft.   Musculoskeletal: Normal range of motion.         General: No swelling or tenderness.   Skin:     General: Skin is warm and dry.   Neurological:      General: No focal deficit present.      Mental Status: He is alert and oriented to person, place, and time.   Psychiatric:         Mood and Affect: Mood normal.         Thought Content: Thought content normal.           CRANIAL NERVES     CN III, IV, VI   Pupils are equal, round, and reactive to light.       Significant Labs:   CBC:   Recent Labs   Lab 07/15/20  0413 07/16/20  0500   WBC 8.24 9.03   HGB 13.1* 13.7*   HCT 39.4* 41.3   * 696*     CMP:   Recent Labs   Lab 07/15/20  0413 07/16/20  0500   * 138   K 4.1 4.5   CL 94* 100   CO2 27 29    113*   BUN 7* 19   CREATININE 0.8 0.8   CALCIUM 8.7 9.5   PROT 7.6 7.5   ALBUMIN 2.3* 2.3*   BILITOT 0.6 0.3   ALKPHOS 153* 159*   AST 80* 55*   ALT 91* 94*   ANIONGAP 11 9   EGFRNONAA >60 >60       Significant Imaging: reviewed.

## 2020-07-16 NOTE — PLAN OF CARE
Pt AAOx4, VSS on 3L NC, scheduled meds administered, cardiac monitoring, cont. pulse ox monitoring, voids per urinal, up to toilet - independent, Air/Drop/ Con precaution maintained, remains free of fall.    Problem: Fall Injury Risk  Goal: Absence of Fall and Fall-Related Injury  Outcome: Ongoing, Progressing  Intervention: Identify and Manage Contributors to Fall Injury Risk  Flowsheets (Taken 7/16/2020 1247)  Self-Care Promotion:   independence encouraged   BADL personal objects within reach   BADL personal routines maintained  Medication Review/Management: medications reviewed  Intervention: Promote Injury-Free Environment  Flowsheets (Taken 7/16/2020 1247)  Safety Promotion/Fall Prevention:   assistive device/personal item within reach   side rails raised x 2   room near unit station   nonskid shoes/socks when out of bed   medications reviewed   Fall Risk reviewed with patient/family   commode/urinal/bedpan at bedside  Environmental Safety Modification:   assistive device/personal items within reach   clutter free environment maintained     Problem: Adult Inpatient Plan of Care  Goal: Plan of Care Review  Outcome: Ongoing, Progressing  Flowsheets (Taken 7/16/2020 1247)  Plan of Care Reviewed With: patient  Goal: Patient-Specific Goal (Individualization)  Outcome: Ongoing, Progressing  Goal: Absence of Hospital-Acquired Illness or Injury  Outcome: Ongoing, Progressing  Intervention: Identify and Manage Fall Risk  Flowsheets (Taken 7/16/2020 1247)  Safety Promotion/Fall Prevention:   assistive device/personal item within reach   side rails raised x 2   room near unit station   nonskid shoes/socks when out of bed   medications reviewed   Fall Risk reviewed with patient/family   commode/urinal/bedpan at bedside  Intervention: Prevent VTE (venous thromboembolism)  Flowsheets (Taken 7/16/2020 1247)  VTE Prevention/Management: bleeding precautions maintained  Goal: Optimal Comfort and  Wellbeing  Outcome: Ongoing, Progressing  Goal: Readiness for Transition of Care  Outcome: Ongoing, Progressing  Goal: Rounds/Family Conference  Outcome: Ongoing, Progressing

## 2020-07-16 NOTE — CONSULTS
[facility] Hospital Medicine  Telemedicine Consult Note    Patient Name: Omid Cadena  MRN: [id number]  Admission Date: 7/15/2020  Hospital Length of Stay: 0 days  Attending Physician: Diann Garcia MD   Primary Care Provider: Paolo Sellers NP       [facility] Medicine consulted for Omid Cadena to be followed through telemedicine modalities.  The patient is currently not accepted for virtual visits due to limited [facility] service capacity.  Please re-consult once the patient is appropriate for virtual visits.        Mayra Romano MD  Department of Hospital Medicine   [facility]

## 2020-07-16 NOTE — PROGRESS NOTES
[facility] Hospital Medicine  Progress Note    Patient Name: Omid Cadena  MRN: [id number]  Patient Class: IP- Inpatient   Admission Date: 7/15/2020  Length of Stay: 1 days  Attending Physician: Diann Garcia MD  Primary Care Provider: Paolo Sellers NP        Subjective:     Principal Problem:Pneumonia due to COVID-19 virus        HPI:  58 yo male recently COVID+ (7/2/20) presents via EMS with acute moderate shortness of breath with hypoxia (84% on home pulse ox), cough productive of white sputum, moderate frontal headache with photophobia, nausea without vomiting, diarrhea, loss of appetite, and difficulty sleeping. Patient states he felt well when he initially tested positive for COVID-19 about two weeks ago, but his current symptoms have developed and worsened about 6 days ago.patient has positive Covid in ER,chest X ray show,Pulmonary infiltrates, the findings are more prominent on the right with areas of suspected developing confluence,his hypoxia is improved with 3 liter NC O 2.    Overview/Hospital Course:  58 yo male recently COVID+ (7/2/20) presents via EMS with acute moderate shortness of breath with hypoxia (84% on home pulse ox), cough productive of white sputum, moderate frontal headache with photophobia, nausea without vomiting, diarrhea, loss of appetite, and difficulty sleeping. Patient states he felt well when he initially tested positive for COVID-19 about two weeks ago, but his current symptoms have developed and worsened about 6 days ago.patient has positive Covid in ER,chest X ray show,Pulmonary infiltrates, the findings are more prominent on the right with areas of suspected developing confluence,his hypoxia is improved with 3 liter NC O 2.  He does not qualify for remdisivir,since initial  Positive covid is over 10 days ago.he is feeling better today.    History reviewed. No pertinent past medical history.    History reviewed. No pertinent surgical  history.    Review of patient's allergies indicates:  No Known Allergies    No current facility-administered medications on file prior to encounter.      Current Outpatient Medications on File Prior to Encounter   Medication Sig    mv-min-FA-Ay-Zz-jycsdqi-lutein (CENTRUM) 0.4-162-18 mg Tab Take 1 tablet by mouth Daily. 1 Tablet Oral Every day    niacin 250 MG CpSR Take 1 capsule by mouth Daily. 1 Capsule, Sustained Release Oral Every day     Family History     None        Tobacco Use    Smoking status: Never Smoker    Smokeless tobacco: Never Used   Substance and Sexual Activity    Alcohol use: Not on file    Drug use: Not on file    Sexual activity: Not on file     Review of Systems   Constitutional: Positive for activity change and appetite change.   HENT: Negative for congestion and dental problem.    Eyes: Negative for discharge and itching.   Respiratory: Positive for cough and shortness of breath. Negative for apnea and chest tightness.    Cardiovascular: Negative for chest pain and leg swelling.   Gastrointestinal: Negative for abdominal distention and abdominal pain.   Endocrine: Negative for cold intolerance and polydipsia.   Genitourinary: Negative for difficulty urinating and dysuria.   Musculoskeletal: Negative for arthralgias and back pain.   Skin: Negative for color change and pallor.   Allergic/Immunologic: Negative for environmental allergies and food allergies.   Neurological: Negative for dizziness and facial asymmetry.   Hematological: Negative for adenopathy. Does not bruise/bleed easily.   Psychiatric/Behavioral: Negative for agitation.     Objective:     Vital Signs (Most Recent):  Temp: 97.8 °F (36.6 °C) (07/16/20 1124)  Pulse: 74 (07/16/20 1124)  Resp: 19 (07/16/20 1124)  BP: 111/71 (07/16/20 1124)  SpO2: (!) 93 % (07/16/20 1124) Vital Signs (24h Range):  Temp:  [97.5 °F (36.4 °C)-98 °F (36.7 °C)] 97.8 °F (36.6 °C)  Pulse:  [68-74] 74  Resp:  [18-20] 19  SpO2:  [92 %-98 %] 93 %  BP:  (108-116)/(61-72) 111/71     Weight: 85.4 kg (188 lb 4.8 oz)  Body mass index is 24.84 kg/m².    Physical Exam  Constitutional:       Appearance: Normal appearance.   HENT:      Head: Normocephalic and atraumatic.      Nose: Nose normal.   Eyes:      Extraocular Movements: Extraocular movements intact.      Pupils: Pupils are equal, round, and reactive to light.   Neck:      Musculoskeletal: Normal range of motion and neck supple.   Cardiovascular:      Rate and Rhythm: Normal rate and regular rhythm.   Pulmonary:      Effort: Pulmonary effort is normal.      Breath sounds: Normal breath sounds.   Abdominal:      General: Abdomen is flat. There is no distension.      Palpations: Abdomen is soft.   Musculoskeletal: Normal range of motion.         General: No swelling or tenderness.   Skin:     General: Skin is warm and dry.   Neurological:      General: No focal deficit present.      Mental Status: He is alert and oriented to person, place, and time.   Psychiatric:         Mood and Affect: Mood normal.         Thought Content: Thought content normal.           CRANIAL NERVES     CN III, IV, VI   Pupils are equal, round, and reactive to light.       Significant Labs:   CBC:   Recent Labs   Lab 07/15/20  0413 07/16/20  0500   WBC 8.24 9.03   HGB 13.1* 13.7*   HCT 39.4* 41.3   * 696*     CMP:   Recent Labs   Lab 07/15/20  0413 07/16/20  0500   * 138   K 4.1 4.5   CL 94* 100   CO2 27 29    113*   BUN 7* 19   CREATININE 0.8 0.8   CALCIUM 8.7 9.5   PROT 7.6 7.5   ALBUMIN 2.3* 2.3*   BILITOT 0.6 0.3   ALKPHOS 153* 159*   AST 80* 55*   ALT 91* 94*   ANIONGAP 11 9   EGFRNONAA >60 >60       Significant Imaging: reviewed.      Assessment/Plan:      * Pneumonia due to COVID-19 virus    patient has positive Covid in ER,chest X ray show,Pulmonary infiltrates, the findings are more prominent on the right with areas of suspected developing confluence,on IV Abx,    Acute respiratory failure with hypoxia  Duo to  Covid ,84% on RA,improved with supplement  Oxygen,stable on Nc O 2,      COVID-19 virus infection  Patient has no improvement after first positive Covid test,he is still positive with covid,symptomatic, and hypoxic,on isolation per covid protocol,on IV Abx,decadron,he qualify for starting remdisivir,he has slight elevated LFT,likley duo to Covid,consent is obtained,he is agree with starting remdesivir,consulted pharmacy,will monitor with daily CMP.  He does not qualify for remdisivir,since initial  Positive covid is over 10 days ago.he is feeling better today.      VTE Risk Mitigation (From admission, onward)         Ordered     enoxaparin injection 40 mg  Every 24 hours      07/15/20 1136     IP VTE HIGH RISK PATIENT  Once      07/15/20 1038     Place sequential compression device  Until discontinued      07/15/20 1038     Place JONATHAN hose  Until discontinued      07/15/20 1038                      Diann Garcia MD  Department of Hospital Medicine   [facility]

## 2020-07-16 NOTE — PLAN OF CARE
07/16/20 1527   Discharge Assessment   Assessment Type Discharge Planning Assessment   Confirmed/corrected address and phone number on facesheet? Yes   Assessment information obtained from? Patient   Prior to hospitilization cognitive status: Alert/Oriented   Prior to hospitalization functional status: Independent   Current cognitive status: Alert/Oriented   Current Functional Status: Independent   Facility Arrived From: home   Lives With alone   Able to Return to Prior Arrangements yes   Is patient able to care for self after discharge? Yes   Patient's perception of discharge disposition home or selfcare   Readmission Within the Last 30 Days no previous admission in last 30 days   Patient currently being followed by outpatient case management? No   Patient currently receives any other outside agency services? No   Equipment Currently Used at Home none   Do you have any problems affording any of your prescribed medications? No   Is the patient taking medications as prescribed? yes   Does the patient have transportation home? Yes   Transportation Anticipated car, drives self;family or friend will provide   Dialysis Name and Scheduled days N/A   Does the patient receive services at the [facility]? No   Discharge Plan A Home;Home Health   Discharge Plan B Home   DME Needed Upon Discharge  oxygen   Patient/Family in Agreement with Plan yes         [facility] - [place], [place] - [address]  [address]  [place] [address]  Phone: [phone] Fax: [phone]    [id number] - [place] - [address]  [address] [place] [address]  Phone: [phone] Fax: [phone]

## 2020-07-16 NOTE — ASSESSMENT & PLAN NOTE
Patient has no improvement after first positive Covid test,he is still positive with covid,symptomatic, and hypoxic,on isolation per covid protocol,on IV Abx,decadron,he qualify for starting remdisivir,he has slight elevated LFT,na duo to Covid,consent is obtained,he is agree with starting remdesivir,consulted pharmacy,will monitor with daily CMP.  He does not qualify for remdisivir,since initial  Positive covid is over 10 days ago.he is feeling better today.

## 2020-07-17 VITALS
HEIGHT: 73 IN | HEART RATE: 78 BPM | TEMPERATURE: 98 F | BODY MASS INDEX: 24.96 KG/M2 | OXYGEN SATURATION: 94 % | SYSTOLIC BLOOD PRESSURE: 142 MMHG | DIASTOLIC BLOOD PRESSURE: 74 MMHG | WEIGHT: 188.31 LBS | RESPIRATION RATE: 18 BRPM

## 2020-07-17 LAB
ALBUMIN SERPL BCP-MCNC: 2.4 G/DL (ref 3.5–5.2)
ALP SERPL-CCNC: 151 U/L (ref 55–135)
ALT SERPL W/O P-5'-P-CCNC: 138 U/L (ref 10–44)
ANION GAP SERPL CALC-SCNC: 11 MMOL/L (ref 8–16)
AST SERPL-CCNC: 66 U/L (ref 10–40)
BASOPHILS # BLD AUTO: 0.03 K/UL (ref 0–0.2)
BASOPHILS NFR BLD: 0.2 % (ref 0–1.9)
BILIRUB SERPL-MCNC: 0.3 MG/DL (ref 0.1–1)
BUN SERPL-MCNC: 20 MG/DL (ref 8–23)
CALCIUM SERPL-MCNC: 9.3 MG/DL (ref 8.7–10.5)
CHLORIDE SERPL-SCNC: 101 MMOL/L (ref 95–110)
CO2 SERPL-SCNC: 29 MMOL/L (ref 23–29)
CREAT SERPL-MCNC: 0.8 MG/DL (ref 0.5–1.4)
CRP SERPL-MCNC: 94.2 MG/L (ref 0–8.2)
DIFFERENTIAL METHOD: ABNORMAL
EOSINOPHIL # BLD AUTO: 0 K/UL (ref 0–0.5)
EOSINOPHIL NFR BLD: 0.3 % (ref 0–8)
ERYTHROCYTE [DISTWIDTH] IN BLOOD BY AUTOMATED COUNT: 12.7 % (ref 11.5–14.5)
EST. GFR  (AFRICAN AMERICAN): >60 ML/MIN/1.73 M^2
EST. GFR  (NON AFRICAN AMERICAN): >60 ML/MIN/1.73 M^2
GLUCOSE SERPL-MCNC: 99 MG/DL (ref 70–110)
HCT VFR BLD AUTO: 41 % (ref 40–54)
HGB BLD-MCNC: 13.1 G/DL (ref 14–18)
IMM GRANULOCYTES # BLD AUTO: 0.23 K/UL (ref 0–0.04)
IMM GRANULOCYTES NFR BLD AUTO: 1.8 % (ref 0–0.5)
LYMPHOCYTES # BLD AUTO: 1.3 K/UL (ref 1–4.8)
LYMPHOCYTES NFR BLD: 10.2 % (ref 18–48)
MCH RBC QN AUTO: 30 PG (ref 27–31)
MCHC RBC AUTO-ENTMCNC: 32 G/DL (ref 32–36)
MCV RBC AUTO: 94 FL (ref 82–98)
MONOCYTES # BLD AUTO: 0.9 K/UL (ref 0.3–1)
MONOCYTES NFR BLD: 7 % (ref 4–15)
NEUTROPHILS # BLD AUTO: 10.2 K/UL (ref 1.8–7.7)
NEUTROPHILS NFR BLD: 80.5 % (ref 38–73)
NRBC BLD-RTO: 0 /100 WBC
PLATELET # BLD AUTO: 809 K/UL (ref 150–350)
PMV BLD AUTO: 9.9 FL (ref 9.2–12.9)
POTASSIUM SERPL-SCNC: 4.2 MMOL/L (ref 3.5–5.1)
PROT SERPL-MCNC: 7.5 G/DL (ref 6–8.4)
RBC # BLD AUTO: 4.36 M/UL (ref 4.6–6.2)
SODIUM SERPL-SCNC: 141 MMOL/L (ref 136–145)
WBC # BLD AUTO: 12.73 K/UL (ref 3.9–12.7)

## 2020-07-17 PROCEDURE — 63700000 PHARM REV CODE 250 ALT 637 W/O HCPCS: Performed by: HOSPITALIST

## 2020-07-17 PROCEDURE — 63600175 PHARM REV CODE 636 W HCPCS: Performed by: HOSPITALIST

## 2020-07-17 PROCEDURE — 25000003 PHARM REV CODE 250: Performed by: EMERGENCY MEDICINE

## 2020-07-17 PROCEDURE — 36415 COLL VENOUS BLD VENIPUNCTURE: CPT

## 2020-07-17 PROCEDURE — 86140 C-REACTIVE PROTEIN: CPT

## 2020-07-17 PROCEDURE — 25000242 PHARM REV CODE 250 ALT 637 W/ HCPCS: Performed by: HOSPITALIST

## 2020-07-17 PROCEDURE — 99900031 HC PATIENT EDUCATION (STAT)

## 2020-07-17 PROCEDURE — 80053 COMPREHEN METABOLIC PANEL: CPT

## 2020-07-17 PROCEDURE — 85025 COMPLETE CBC W/AUTO DIFF WBC: CPT

## 2020-07-17 RX ORDER — AZITHROMYCIN 250 MG/1
250 TABLET, FILM COATED ORAL DAILY
Status: DISCONTINUED | OUTPATIENT
Start: 2020-07-17 | End: 2020-07-17 | Stop reason: HOSPADM

## 2020-07-17 RX ADMIN — DEXAMETHASONE 6 MG: 4 TABLET ORAL at 09:07

## 2020-07-17 RX ADMIN — AZITHROMYCIN MONOHYDRATE 250 MG: 250 TABLET ORAL at 09:07

## 2020-07-17 RX ADMIN — CEFTRIAXONE 1 G: 1 INJECTION, SOLUTION INTRAVENOUS at 09:07

## 2020-07-17 RX ADMIN — FAMOTIDINE 20 MG: 20 TABLET ORAL at 09:07

## 2020-07-17 NOTE — CONSULTS
Case Mgmt consult received for [employer]. KALEIGH discussed with patient who agreed to visit.  KALEIHG verified discharging address and phone number.    TN emailed DC summary, facesheet and CM consult to [email or web address] requesting the following services:  Please monitor for Covid complications and respiratory distress / difficulty.  Please provide a Pulse Ox, teach how to use and instruct patient to record findings.  Please reinforce hospital teaching for complications such as what symptoms to call the doctor for and when to call 911.    SW called [facility] [employer] @ [phone] and spoke with Yuli who stated that order will be dispatched.    Patient notified that [facility] [employer] will follow up with within 4 hours of arrival home.    All information placed on AVS.

## 2020-07-17 NOTE — PROGRESS NOTES
[facility] CASE MANAGEMENT                  WRITTEN DISCHARGE INFORMATION      APPOINTMENTS AND RESOURCES TO HELP YOU MANAGE YOUR CARE AT HOME BASED ON YOUR PREFERENCES:  (If an appointment is not scheduled for you when you leave the hospital, call your doctor to schedule a follow up visit within a week)    Follow-up Information     Kaushik Higgins MD On 7/27/2020.    Specialty: Family Medicine  Why: Outpatient Services@12:45pm   Contact information:  [address]   [address]  [facility]  [facility] [address]  [phone]             [facility] .    Why: Please call [phone] with any questions            [facility]    Specialty: DME Provider  Why: DME  Contact information:  [address]  [facility] [id number] [phone]                 Healthy Living Instructions to HELP MANAGE YOUR CARE AT HOME:  Things You are responsible for:  1.    Getting your prescriptions filled   2.    Taking your medications as directed, DO NOT MISS ANY DOSES!  3.    Following the diet and exercise recommended by your doctor  4.    Going to your follow-up doctor appointment. This is important because it allows the doctor to monitor your progress and determine if any changes need to made to your treatment plan.  5. If you have any questions about MANAGING YOUR CARE AT HOME Call the Nurse Care Line for 24/7 Assistance [phone]       Please answer any calls you may receive from [facility]. We want to continue to support you as you manage your healthcare needs. [facility] is happy to have the opportunity to serve you.      Thank you for choosing [facility] for your healthcare needs!  Your [facility] Case Management Team,

## 2020-07-17 NOTE — NURSING
Ambulatory O2 sats:    on RA at rest - 91%    on RA with exercise - 87%    on 2L NC with Exercise - 91%

## 2020-07-17 NOTE — PROGRESS NOTES
TN called patient in his hospital room for education:  .TN taught Symptoms and Problems for COVID-19  home care with pt and   with teach back:  1. ISOLATE, 2. HAND WASHING, 3. SOB. Med surg nurse will print out AVS  education sheet for pt's discharge.    Help at home will be from staff, Kay Hanson, assisting in pt's recovery.    Patient's preference is for [facility] in [place].     TN taught patient about things HE is responsible for when discharged TO HELP WITH HIS RECOVERY:  Particularly on how to Manage HIS Care At Home:  1. Getting his prescriptions filled.  2. Taking his medications as directed. DO NOT MISS ANY DOSES!  3. Going to his follow-up doctor appointments.   .  Diana Montes, RN, BSN, STN CCM

## 2020-07-17 NOTE — NURSING
Pt aa&o x4. 3L 02 per NC in use. Independent of ADL's. Remained free from falls/injuries this shift. Cardiac monitoring with cont pulse ox. Call light within reach. Will continue to monitor.

## 2020-07-17 NOTE — DISCHARGE SUMMARY
[facility] Hospital Medicine  Discharge Summary      Patient Name: Omid Cadena  MRN: [id number]  Admission Date: 7/15/2020  Hospital Length of Stay: 2 days  Discharge Date and Time:  07/17/2020 10:47 AM  Attending Physician: Diann Garcia MD   Discharging Provider: Diann Garcia MD  Primary Care Provider: Kaushik Higgins MD      HPI:   58 yo male recently COVID+ (7/2/20) presents via EMS with acute moderate shortness of breath with hypoxia (84% on home pulse ox), cough productive of white sputum, moderate frontal headache with photophobia, nausea without vomiting, diarrhea, loss of appetite, and difficulty sleeping. Patient states he felt well when he initially tested positive for COVID-19 about two weeks ago, but his current symptoms have developed and worsened about 6 days ago.patient has positive Covid in ER,chest X ray show,Pulmonary infiltrates, the findings are more prominent on the right with areas of suspected developing confluence,his hypoxia is improved with 3 liter NC O 2.    * No surgery found *      Hospital Course:   58 yo male recently COVID+ (7/2/20) presents via EMS with acute moderate shortness of breath with hypoxia (84% on home pulse ox), cough productive of white sputum, moderate frontal headache with photophobia, nausea without vomiting, diarrhea, loss of appetite, and difficulty sleeping. Patient states he felt well when he initially tested positive for COVID-19 about two weeks ago, but his current symptoms have developed and worsened about 6 days ago.patient has positive Covid in ER,chest X ray show,Pulmonary infiltrates, the findings are more prominent on the right with areas of suspected developing confluence,his hypoxia is improved with 3 liter NC O 2.  He does not qualify for remdisivir,since initial  Positive covid is over 10 days ago.he was feeling better,he was on IV Abx,and decadron,his hypoxia is improved,clinicaly was much better,but he  "still require 2 liter home oxygen at DC time.he will follow up with PCP in next few days.     Consults:   Consults (From admission, onward)        Status Ordering Provider     Inpatient virtual consult to Hospital Medicine  Once     Provider:  Mayra Romano MD    Completed MARIBEL TAI          No new Assessment & Plan notes have been filed under this hospital service since the last note was generated.  Service: Hospital Medicine    Final Active Diagnoses:    Diagnosis Date Noted POA    PRINCIPAL PROBLEM:  Pneumonia due to COVID-19 virus [U07.1, J12.89] 07/15/2020 Yes    COVID-19 virus infection [U07.1] 07/15/2020 Yes    Acute respiratory failure with hypoxia [J96.01] 07/15/2020 Yes      Problems Resolved During this Admission:       Discharged Condition: stable    Disposition: Home or Self Care    Follow Up:  Follow-up Information     Kaushik Higgins MD On 7/27/2020.    Specialty: Family Medicine  Why: Outpatient Services@12:45pm   Contact information:  [address]  [address]  [place] [facility]  [facility] [id number] [phone]             Kaushik Higgins MD In 1 week.    Specialty: Family Medicine  Contact information:  [address]  [facility] [address]  [phone]                 Patient Instructions:      OXYGEN FOR HOME USE     Order Specific Question Answer Comments   Liter Flow 2    Duration Continuous    Qualifying SpO2: 87    Testing done at: Exercise/Activity    Route nasal cannula    Portable mode: continuous    Device home concentrator with portable unit    Length of need (in months): 99 mos    Patient condition with qualifying saturation  Covid   Height: 6' 1" (1.854 m)    Weight: 85.4 kg (188 lb 4.8 oz)    Does patient have medical equipment at home? none    Alternative treatment measures have been tried or considered and deemed clinically ineffective. Yes    Vendor: [facility]    Expected Date of Delivery: 7/17/2020  "     COVID-19 Surveillance Program     Order Specific Question Answer Comments   Does patient have a smartphone? Yes    Does patient have the [facility] on their smartphone? Yes      Activity as tolerated       Significant Diagnostic Studies: Labs:   BMP:   Recent Labs   Lab 07/16/20  0500 07/17/20  0518   * 99    141   K 4.5 4.2    101   CO2 29 29   BUN 19 20   CREATININE 0.8 0.8   CALCIUM 9.5 9.3   , CMP   Recent Labs   Lab 07/16/20  0500 07/17/20  0518    141   K 4.5 4.2    101   CO2 29 29   * 99   BUN 19 20   CREATININE 0.8 0.8   CALCIUM 9.5 9.3   PROT 7.5 7.5   ALBUMIN 2.3* 2.4*   BILITOT 0.3 0.3   ALKPHOS 159* 151*   AST 55* 66*   ALT 94* 138*   ANIONGAP 9 11   ESTGFRAFRICA >60 >60   EGFRNONAA >60 >60    and CBC   Recent Labs   Lab 07/16/20  0500 07/17/20  0518   WBC 9.03 12.73*   HGB 13.7* 13.1*   HCT 41.3 41.0   * 809*   Radiology: X-Ray: CXR: X-Ray Chest 1 View (CXR): No results found for this visit on 07/15/20. and X-Ray Chest PA and Lateral (CXR): No results found for this visit on 07/15/20.    Pending Diagnostic Studies:     None         Medications:  Reconciled Home Medications:      Medication List      START taking these medications    budesonide 180mcg 180 mcg/actuation Aepb  Commonly known as: PULMICORT 180mcg  Inhale 2 puffs into the lungs 2 (two) times a day. Controller     pulse oximeter device  Commonly known as: pulse oximeter  by Apply Externally route 2 (two) times a day. Use twice daily at 8 AM and 3 PM and [facility] the [facility] as directed.        CONTINUE taking these medications    CENTRUM 0.4-162-18 mg Tab  Generic drug: mv-min-FA-Yc-Ni-oeymzoq-lutein  Take 1 tablet by mouth Daily. 1 Tablet Oral Every day     niacin 250 MG Cpsr  Take 1 capsule by mouth Daily. 1 Capsule, Sustained Release Oral Every day            Indwelling Lines/Drains at time of discharge:   Lines/Drains/Airways     None                 Time spent on the discharge of  patient: over 30  minutes  Patient was seen and examined on the date of discharge and determined to be suitable for discharge.         Diann Garcia MD  Department of Hospital Medicine  [facility]

## 2020-07-17 NOTE — PLAN OF CARE
07/17/20 1300   Final Note   Assessment Type Final Discharge Note   Anticipated Discharge Disposition Home   Hospital Follow Up  Appt(s) scheduled? Yes   Discharge plans and expectations educations in teach back method with documentation complete? Yes       SW informed Nurse Fariba pt was cleared from case management.

## 2020-07-17 NOTE — NURSING
VSS, NAD, Afebrile, voids without difficulty per urinal. Discharge instructions explained and handed to pt. O2 tank and O2 concentrator given to pt. Pt verbalizes understanding. IV access dc. Cardiac monitoring dc. Discharged home via wheelchair, surgical mask in place.

## 2020-07-18 ENCOUNTER — NURSE TRIAGE (OUTPATIENT)
Dept: ADMINISTRATIVE | Facility: CLINIC | Age: 60
End: 2020-07-18

## 2020-07-18 NOTE — TELEPHONE ENCOUNTER
Attempted to contact pt for enrollment outreach for Covid Surveillance program. No answer. Left voicemail. Unable to send [employer] message due to pending status. Will attempt outreach again later today.    Reason for Disposition   No answer.  First attempt to contact caller.  Follow-up call scheduled within 15 minutes.    Protocols used: NO CONTACT OR DUPLICATE CONTACT CALL-A-AH

## 2020-07-18 NOTE — TELEPHONE ENCOUNTER
Contacted pt for enrollment outreach for Covid Surveillance program. Verified pt by first and last name and . Pt declined surveillance program. Says that he is feeling a little better and just doesn't want to be bothered. He has people around him that are checking on him and he is constantly checking his VS. Unenrolled pt from program. Pt is on 2L of home O2. Pt states he took a walk earlier today and his O2 dropped below 90 but he put his O2 back on and it went back up to 93. Advised pt not to go on walks without O2 if his levels drop like that. Need to keep O2 sat above 90%. Offered pt Home monitoring text program. Pt accepted. Placed order. Gave pt number for OOC if symptoms worsen.    Reason for Disposition   General information question, no triage required and triager able to answer question    Protocols used: INFORMATION ONLY CALL - NO TRIAGE-A-

## 2020-07-20 LAB
BACTERIA BLD CULT: NORMAL
BACTERIA BLD CULT: NORMAL

## 2025-07-17 ENCOUNTER — PATIENT OUTREACH (OUTPATIENT)
Dept: ADMINISTRATIVE | Facility: CLINIC | Age: 65
End: 2025-07-17
Payer: MEDICARE

## 2025-07-17 NOTE — PROGRESS NOTES
C3 nurse attempted to reach the patient for a follow up call no answer, voicemail full. None [facility] pcp.

## 2025-07-18 NOTE — PROGRESS NOTES
C3 nurse spoke with Omid Cadena  for a TCC post hospital discharge follow up call. The patient does not have a scheduled HOSFU appointment with Kaushik Higgins MD  within 5-7 days post hospital discharge date 7/12/25. C3 nurse was unable to schedule HOSFU appointment in [facility].

## 2025-07-18 NOTE — PROGRESS NOTES
2nd attempt. C3 nurse attempted to reach the patient for a follow up call no answer, voicemail full. None [facility].